# Patient Record
Sex: MALE | Race: WHITE
[De-identification: names, ages, dates, MRNs, and addresses within clinical notes are randomized per-mention and may not be internally consistent; named-entity substitution may affect disease eponyms.]

---

## 2021-04-25 ENCOUNTER — HOSPITAL ENCOUNTER (EMERGENCY)
Dept: HOSPITAL 75 - ER | Age: 21
Discharge: HOME | End: 2021-04-25
Payer: COMMERCIAL

## 2021-04-25 VITALS — HEIGHT: 77.01 IN | WEIGHT: 270.07 LBS | BODY MASS INDEX: 31.89 KG/M2

## 2021-04-25 VITALS — SYSTOLIC BLOOD PRESSURE: 157 MMHG | DIASTOLIC BLOOD PRESSURE: 85 MMHG

## 2021-04-25 DIAGNOSIS — F17.290: ICD-10-CM
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
## 2021-04-25 DIAGNOSIS — S01.111A: ICD-10-CM

## 2021-04-25 DIAGNOSIS — S06.0X0A: Primary | ICD-10-CM

## 2021-04-25 PROCEDURE — 70450 CT HEAD/BRAIN W/O DYE: CPT

## 2021-04-25 PROCEDURE — 12051 INTMD RPR FACE/MM 2.5 CM/<: CPT

## 2021-04-25 PROCEDURE — 70486 CT MAXILLOFACIAL W/O DYE: CPT

## 2021-04-25 NOTE — ED FALL/INJURY
General


Chief Complaint:  Laceration


Stated Complaint:  FALL ON [facility]CRETE STAIRS/R BROW LAC


Nursing Triage Note:  


TO ED VIA POV AND AMBULATORY TO ROOM 6 WITH C/O LAC TO RIGHT EYEBROW AFTER 


FALLING APPROX 30MIN PTA ONTO CONCRETE. PT STATES HE HAD JUST FINISHED SMOKING 


MARIJUANA.


Source:  patient


Exam Limitations:  no limitations





History of Present Illness


Date Seen by Provider:  Apr 25, 2021


Time Seen by Provider:  20:00


Initial Comments


This 20-year-old young man presents to emergency room with laceration to the 

right periorbital region after falling and striking his face on some stairs.  He

admits to smoking marijuana in a closed shed.  He reports it was getting hot in 

the shed and he was getting lightheaded.  He rushed out of the shed, tripped, 

and fell into the stairway.  He is uncertain of loss of consciousness.  He does 

not feel lightheaded now.  He denies injury to any other part of his body.  He 

denies neck pain or tenderness on exam.  He denies alcohol use or any other 

substance use besides marijuana.  He is alert and oriented and ambulatory.  He 

reports history of tetanus vaccination within the last 5 years.





Allergies and Home Medications


Allergies


Coded Allergies:  


     No Known Drug Allergies (Unverified , 4/25/21)





Patient Home Medication List


Home Medication List Reviewed:  Yes





Review of Systems


Review of Systems


Constitutional:  see HPI


Eyes:  No Symptoms Reported


Ears, Nose, Mouth, Throat:  no symptoms reported


Respiratory:  no symptoms reported


Cardiovascular:  see HPI


Gastrointestinal:  no symptoms reported


Genitourinary:  no symptoms reported


Musculoskeletal:  no symptoms reported


Skin:  see HPI


Psychiatric/Neurological:  See HPI





Past Medical-Social-Family Hx


Past Med/Social Hx:  Reviewed Nursing Past Med/Soc Hx


Patient Social History


Alcohol Use:  Denies Use


Drug of Choice:  MARIJUANA


Smoking Status:  Current Everyday Smoker


Type Used:  Electronic/Vapor


Recent Infectious Disease Expo:  No





Immunizations Up To Date


Tetanus Booster (TDap):  Less than 5yrs





Past Medical History


Surgeries:  Yes (WISDOM TEETH)


Respiratory:  No


Cardiac:  No


Neurological:  No


Genitourinary:  No


Gastrointestinal:  No


Musculoskeletal:  No


Endocrine:  No


HEENT:  No


Cancer:  No


Psychosocial:  No


Integumentary:  No


Blood Disorders:  No





Physical Exam


Vital Signs





Vital Signs - First Documented








 4/25/21 4/25/21





 19:56 21:46


 


Temp 36.9 


 


Pulse 102 


 


Resp 16 


 


B/P (MAP) 153/108 (123) 


 


Pulse Ox  96


 


O2 Delivery Room Air 





Capillary Refill : Less Than 3 Seconds


Height, Weight, BMI


Height: '"


Weight: lbs. oz. kg; 32.00 BMI


Method:


General Appearance:  WD/WN, no apparent distress


HEENT:  PERRL/EOMI, other (Conjunctival injection and pink sclera, presumably 

from marijuana smoke exposure.  5 cm deep irregular laceration over the right 

periorbital region extending from the lateral edge of the right eyebrow to the 

upper portion of the cheek.  Eye and eyelids are unaffected.  Vision is 

unaffected.  Extraocular movements intact.  No diplopia.)


Neck:  non-tender, full range of motion, normal inspection


Cardiovascular:  regular rate, rhythm, no edema, no murmur


Respiratory:  lungs clear, normal breath sounds, no respiratory distress, no 

accessory muscle use


Extremities:  normal inspection, no pedal edema


Neurologic/Psychiatric:  CNs II-XII nml as tested, no motor/sensory deficits, 

alert, normal mood/affect, oriented x 3


Skin:  normal color, warm/dry, other (Laceration as described above)





Toi Coma Score


Best Eye Response:  (4) Open Spontaneously


Best Verbal Response:  (5) Oriented


Best Motor Response:  (6) Obeys Commands


Toi Total:  15





Procedures/Interventions





   Wound Location:  Face


Other Wound Location


Face, lateral right periorbital region


   Wound Length (cm):  5


   Wound's Depth, Shape:  irregular, sub Q


   Wound Explored:  foreign body removed


   Irrigated w/ Saline (ccs):  100


   Betadine Prep?:  Yes


   Anesthesia:  1% Lidocaine


   Volume Anesthetic (ccs):  4


   Suture:  Prolene


   Suture Size:  5-0


   Number of Sutures:  9


Progress


Wound was kept moist with saline and gauze while obtaining imaging.  Wound was 

anesthetized first by spraying the cut surface with lidocaine.  Skin was then 

cleaned with alcohol and local anesthetic was injected.  The wound was irrigated

with normal saline using a syringe and 18-gauge IV catheter tip.  Two small bits

of gravel and some eyebrow hairs were removed from the wound using forceps.  

Betadine prep was applied.  Wound was very carefully approximated using 5-0 

Prolene.  Great care was taken to align the edges to preserve aesthetics and 

anatomic structure.  Repair was difficult due to the irregular and deep nature 

of the wound.  Patient was advised that some degree of scarring was inevitable. 

He was advised to protect the wound from direct sunlight.  He was advised 

moisturizer such as Vaseline could be used starting in a couple of days.  He was

advised to monitor the wound for infection.





Progress/Results/Core Measures


Results/Orders


My Orders





Orders - BILL HICKS MD


Ct Head/Maxillofacial Wo (4/25/21 20:05)


Lidocaine 1% Inj 20 Ml (Xylocaine 1% Inj (4/25/21 20:15)


Sodium Bicarbonate 8.4% Vial (Sodium Bic (4/25/21 20:15)





Medications Given in ED





Vital Signs/I&O











 4/25/21 4/25/21





 19:56 21:46


 


Temp 36.9 36.9


 


Pulse 102 86


 


Resp 16 16


 


B/P (MAP) 153/108 (123) 157/85 (123)


 


Pulse Ox  96


 


O2 Delivery Room Air Room Air














Blood Pressure Mean:                    123











Progress


Progress Note :  


Progress Note


Wound was cleaned and approximated with sutures.  See discussion in the 

procedure note.  It is unclear if patient had a concussion as concussion 

symptoms are convoluted by influence of marijuana.  As a precaution, patient was

advised to follow concussion precautions.  Patient was advised to avoid use of 

mind altering illicit substances in the future.





Departure


Impression





   Primary Impression:  


   Laceration of face


   Qualified Codes:  S01.81XA - Laceration without foreign body of other part of

   head, initial encounter


   Additional Impressions:  


   Fall on same level


   Qualified Codes:  W18.30XA - Fall on same level, unspecified, initial 

   encounter


   Concussion


   Qualified Codes:  S06.0X0A - Concussion without loss of consciousness, 

   initial encounter


Disposition:  01 HOME, SELF-CARE


Condition:  Improved





Departure-Patient Inst.


Decision time for Depature:  21:35


Referrals:  


NO,LOCAL PHYSICIAN (PCP/Family)


Primary Care Physician


Patient Instructions:  Laceration Repair With Stitches (DC), Concussion, Adult 

ED





Add. Discharge Instructions:  


Keep the wound clean and dry except for normal showering.  You may allow soapy 

water to run over the wound but do not scrub the wound directly to avoid disru

pting the sutures.  You may start showering tomorrow morning.


Monitor the wound for signs of infection such as increasing redness, increasing 

swelling, puslike drainage, or fever.  Return to care promptly if you notice 

these symptoms.


Expect a small amount of clear yellow and bloody drainage to come from the wound

over the next day or 2.


Tylenol and/or ibuprofen may be used for pain.


Return in 7 days to have the sutures removed.  You do not need an appointment 

when you return.


Avoid direct sunlight for the next few months as direct sunlight on the healing 

scar will increase discoloration.  You will have some degree of scarring with 

this wound which is inevitable.


Call with questions or concerns.


Return to the ER if you have worsening symptoms.  


Avoid any activity that would predispose you to further head injury such as bike

riding, contact sports, use of heights, etc. for at least 7 days.  Stop any 

activity that causes increase in concussion symptoms such as headache, 

confusion, blurry vision, nausea, etc.





All discharge instructions reviewed with patient and/or family. Voiced 

understanding.











BILL HICKS MD        Apr 25, 2021 21:38

## 2021-04-25 NOTE — DIAGNOSTIC IMAGING REPORT
PROCEDURE: CT head and maxillofacial without contrast.



TECHNIQUE: Multiple contiguous axial images were obtained through

the head and facial bones without the use of intravenous

contrast. Auto Exposure Controls were utilized during the CT exam

to meet ALARA standards for radiation dose reduction. 



INDICATION: Fall, lacerations periorbital on the right.



COMPARISON: No relevant comparison.



FINDINGS: 



CT head: Note is made of a benign chronic arachnoid cyst in the

floor of the left middle cranial fossa at the anteroinferior

temporal lobe measuring 4 cm x 2 cm. There is no intracranial

hemorrhage, hydrocephalus, edema, mass or mass effect. No

pneumocephalus. No calvarial fracture deformity. There is no

evidence for cerebral edema or hydrocephalus. No abnormal

extra-axial fluid collection. No soft tissue density mass.



CT facial bones: There is right-sided preseptal periorbital soft

tissue swelling of the bony orbital walls, intact. The globes are

unremarkable. There is no proptosis and no postseptal or

retrobulbar hematoma. The paranasal sinuses and their walls are

unremarkable. There is no hemosinus. The nasal bones and bony

nasal septum are unremarkable. The maxilla and pterygoid plates

are intact. The zygomatic arches and mandible intact. The

anterior and posterior walls of the frontal sinuses intact. 



IMPRESSION:

1. CT head: Benign chronic left middle cranial fossa extra-axial

arachnoid cyst without significant mass effect. 

2. No hemorrhage or acute appearing intracerebral pathology.

3. CT facial bones: Soft tissue swelling in right periorbital

with no facial fracture, hemosinus or proptosis. 



Dictated by: 



  Dictated on workstation # [id number]

## 2021-05-02 ENCOUNTER — HOSPITAL ENCOUNTER (EMERGENCY)
Dept: HOSPITAL 75 - ER | Age: 21
Discharge: HOME | End: 2021-05-02
Payer: COMMERCIAL

## 2021-05-02 VITALS — BODY MASS INDEX: 32.19 KG/M2 | HEIGHT: 76.77 IN | WEIGHT: 269.85 LBS

## 2021-05-02 VITALS — DIASTOLIC BLOOD PRESSURE: 73 MMHG | SYSTOLIC BLOOD PRESSURE: 117 MMHG

## 2021-05-02 DIAGNOSIS — Z48.02: Primary | ICD-10-CM
